# Patient Record
Sex: FEMALE | Race: OTHER | URBAN - METROPOLITAN AREA
[De-identification: names, ages, dates, MRNs, and addresses within clinical notes are randomized per-mention and may not be internally consistent; named-entity substitution may affect disease eponyms.]

---

## 2019-02-26 ENCOUNTER — EMERGENCY (EMERGENCY)
Facility: HOSPITAL | Age: 24
LOS: 1 days | Discharge: HOME | End: 2019-02-26
Attending: EMERGENCY MEDICINE | Admitting: EMERGENCY MEDICINE
Payer: MEDICAID

## 2019-02-26 VITALS
SYSTOLIC BLOOD PRESSURE: 128 MMHG | TEMPERATURE: 98 F | RESPIRATION RATE: 16 BRPM | HEART RATE: 85 BPM | OXYGEN SATURATION: 100 % | DIASTOLIC BLOOD PRESSURE: 50 MMHG

## 2019-02-26 LAB
APPEARANCE UR: CLEAR — SIGNIFICANT CHANGE UP
BACTERIA # UR AUTO: NEGATIVE — SIGNIFICANT CHANGE UP
BILIRUB UR-MCNC: NEGATIVE — SIGNIFICANT CHANGE UP
BLOOD UR QL VISUAL: HIGH
COLOR SPEC: SIGNIFICANT CHANGE UP
GLUCOSE UR-MCNC: NEGATIVE — SIGNIFICANT CHANGE UP
HCG SERPL-ACNC: < 5 MIU/ML — SIGNIFICANT CHANGE UP
HYALINE CASTS # UR AUTO: NEGATIVE — SIGNIFICANT CHANGE UP
KETONES UR-MCNC: NEGATIVE — SIGNIFICANT CHANGE UP
LEUKOCYTE ESTERASE UR-ACNC: NEGATIVE — SIGNIFICANT CHANGE UP
NITRITE UR-MCNC: NEGATIVE — SIGNIFICANT CHANGE UP
PH UR: 6 — SIGNIFICANT CHANGE UP (ref 5–8)
PROT UR-MCNC: 20 — SIGNIFICANT CHANGE UP
RBC CASTS # UR COMP ASSIST: SIGNIFICANT CHANGE UP (ref 0–?)
SP GR SPEC: 1.03 — SIGNIFICANT CHANGE UP (ref 1–1.04)
SQUAMOUS # UR AUTO: SIGNIFICANT CHANGE UP
UROBILINOGEN FLD QL: NORMAL — SIGNIFICANT CHANGE UP
WBC UR QL: SIGNIFICANT CHANGE UP (ref 0–?)

## 2019-02-26 PROCEDURE — 99283 EMERGENCY DEPT VISIT LOW MDM: CPT

## 2019-02-26 NOTE — ED PROVIDER NOTE - OBJECTIVE STATEMENT
23F h/o molar pregnancy and irregular menses presents with pelvic pain two days ago, and N/V two days ago.  Symptoms mostly improved, but started having heavy vaginal bleeding today and passed a large clot.  Concerned about pregnancy/miscarriage, as her menses is 10 days late.  Also endorses frequent urination.  Denies fever/chills, cp, sob, diarrhea, any further N/V.

## 2019-02-26 NOTE — ED PROVIDER NOTE - NSFOLLOWUPINSTRUCTIONS_ED_ALL_ED_FT
Your urine and blood result was negative in the emergency department.      Recommend follow up with gynecology.    Return to the ER for any worsening symptoms.

## 2019-02-26 NOTE — ED PROVIDER NOTE - CLINICAL SUMMARY MEDICAL DECISION MAKING FREE TEXT BOX
- pelvic pain (self resolved) and vaginal bleeding, eval pregnancy, eval UTI  - hcg, ua, urine culture

## 2019-02-26 NOTE — ED ADULT TRIAGE NOTE - CHIEF COMPLAINT QUOTE
Pt c/o lower abdominal/pelvic pain, nausea, w/ intermittent vaginal bleeding (none currently), states 10 days late for LMP.  States went to [facility], had negative UCG, and advised to come to ED for Serum HCG, TVUS and r/o ectopic. Denies urinary symptoms. Pt appearing in no acute distress, vitally stable.  PMHx molar pregnancy 2016.

## 2019-02-28 LAB
BACTERIA UR CULT: SIGNIFICANT CHANGE UP
SPECIMEN SOURCE: SIGNIFICANT CHANGE UP

## 2020-02-06 ENCOUNTER — EMERGENCY (EMERGENCY)
Facility: HOSPITAL | Age: 25
LOS: 1 days | Discharge: HOME | End: 2020-02-06
Attending: EMERGENCY MEDICINE
Payer: SELF-PAY

## 2020-02-06 VITALS
DIASTOLIC BLOOD PRESSURE: 70 MMHG | HEART RATE: 76 BPM | SYSTOLIC BLOOD PRESSURE: 105 MMHG | RESPIRATION RATE: 18 BRPM | WEIGHT: 123.9 LBS | TEMPERATURE: 97 F | HEIGHT: 63 IN | OXYGEN SATURATION: 98 %

## 2020-02-06 LAB
ALBUMIN SERPL ELPH-MCNC: 3.8 G/DL — SIGNIFICANT CHANGE UP (ref 3.5–5)
ALP SERPL-CCNC: 46 U/L — SIGNIFICANT CHANGE UP (ref 40–120)
ALT FLD-CCNC: 30 U/L DA — SIGNIFICANT CHANGE UP (ref 10–60)
ANION GAP SERPL CALC-SCNC: 6 MMOL/L — SIGNIFICANT CHANGE UP (ref 5–17)
AST SERPL-CCNC: 25 U/L — SIGNIFICANT CHANGE UP (ref 10–40)
BASOPHILS # BLD AUTO: 0.02 K/UL — SIGNIFICANT CHANGE UP (ref 0–0.2)
BASOPHILS NFR BLD AUTO: 0.3 % — SIGNIFICANT CHANGE UP (ref 0–2)
BILIRUB SERPL-MCNC: 0.8 MG/DL — SIGNIFICANT CHANGE UP (ref 0.2–1.2)
BUN SERPL-MCNC: 15 MG/DL — SIGNIFICANT CHANGE UP (ref 7–18)
CALCIUM SERPL-MCNC: 9.1 MG/DL — SIGNIFICANT CHANGE UP (ref 8.4–10.5)
CHLORIDE SERPL-SCNC: 108 MMOL/L — SIGNIFICANT CHANGE UP (ref 96–108)
CO2 SERPL-SCNC: 27 MMOL/L — SIGNIFICANT CHANGE UP (ref 22–31)
CREAT SERPL-MCNC: 0.78 MG/DL — SIGNIFICANT CHANGE UP (ref 0.5–1.3)
EOSINOPHIL # BLD AUTO: 0.2 K/UL — SIGNIFICANT CHANGE UP (ref 0–0.5)
EOSINOPHIL NFR BLD AUTO: 3.4 % — SIGNIFICANT CHANGE UP (ref 0–6)
GLUCOSE SERPL-MCNC: 81 MG/DL — SIGNIFICANT CHANGE UP (ref 70–99)
HCG UR QL: NEGATIVE — SIGNIFICANT CHANGE UP
HCT VFR BLD CALC: 37.8 % — SIGNIFICANT CHANGE UP (ref 34.5–45)
HGB BLD-MCNC: 12.8 G/DL — SIGNIFICANT CHANGE UP (ref 11.5–15.5)
IMM GRANULOCYTES NFR BLD AUTO: 0.3 % — SIGNIFICANT CHANGE UP (ref 0–1.5)
LYMPHOCYTES # BLD AUTO: 1.89 K/UL — SIGNIFICANT CHANGE UP (ref 1–3.3)
LYMPHOCYTES # BLD AUTO: 31.7 % — SIGNIFICANT CHANGE UP (ref 13–44)
MCHC RBC-ENTMCNC: 27 PG — SIGNIFICANT CHANGE UP (ref 27–34)
MCHC RBC-ENTMCNC: 33.9 GM/DL — SIGNIFICANT CHANGE UP (ref 32–36)
MCV RBC AUTO: 79.7 FL — LOW (ref 80–100)
MONOCYTES # BLD AUTO: 0.6 K/UL — SIGNIFICANT CHANGE UP (ref 0–0.9)
MONOCYTES NFR BLD AUTO: 10.1 % — SIGNIFICANT CHANGE UP (ref 2–14)
NEUTROPHILS # BLD AUTO: 3.23 K/UL — SIGNIFICANT CHANGE UP (ref 1.8–7.4)
NEUTROPHILS NFR BLD AUTO: 54.2 % — SIGNIFICANT CHANGE UP (ref 43–77)
NRBC # BLD: 0 /100 WBCS — SIGNIFICANT CHANGE UP (ref 0–0)
PLATELET # BLD AUTO: 212 K/UL — SIGNIFICANT CHANGE UP (ref 150–400)
POTASSIUM SERPL-MCNC: 4 MMOL/L — SIGNIFICANT CHANGE UP (ref 3.5–5.3)
POTASSIUM SERPL-SCNC: 4 MMOL/L — SIGNIFICANT CHANGE UP (ref 3.5–5.3)
PROT SERPL-MCNC: 7.9 G/DL — SIGNIFICANT CHANGE UP (ref 6–8.3)
RBC # BLD: 4.74 M/UL — SIGNIFICANT CHANGE UP (ref 3.8–5.2)
RBC # FLD: 12.7 % — SIGNIFICANT CHANGE UP (ref 10.3–14.5)
SODIUM SERPL-SCNC: 141 MMOL/L — SIGNIFICANT CHANGE UP (ref 135–145)
TSH SERPL-MCNC: 0.68 UU/ML — SIGNIFICANT CHANGE UP (ref 0.34–4.82)
WBC # BLD: 5.96 K/UL — SIGNIFICANT CHANGE UP (ref 3.8–10.5)
WBC # FLD AUTO: 5.96 K/UL — SIGNIFICANT CHANGE UP (ref 3.8–10.5)

## 2020-02-06 PROCEDURE — 70450 CT HEAD/BRAIN W/O DYE: CPT

## 2020-02-06 PROCEDURE — 84443 ASSAY THYROID STIM HORMONE: CPT

## 2020-02-06 PROCEDURE — 36415 COLL VENOUS BLD VENIPUNCTURE: CPT

## 2020-02-06 PROCEDURE — 85027 COMPLETE CBC AUTOMATED: CPT

## 2020-02-06 PROCEDURE — 99284 EMERGENCY DEPT VISIT MOD MDM: CPT

## 2020-02-06 PROCEDURE — 80053 COMPREHEN METABOLIC PANEL: CPT

## 2020-02-06 PROCEDURE — 70450 CT HEAD/BRAIN W/O DYE: CPT | Mod: 26

## 2020-02-06 PROCEDURE — 99284 EMERGENCY DEPT VISIT MOD MDM: CPT | Mod: 25

## 2020-02-06 PROCEDURE — 81025 URINE PREGNANCY TEST: CPT

## 2020-02-06 NOTE — ED ADULT NURSE REASSESSMENT NOTE - NS ED NURSE REASSESS COMMENT FT1
FEELS BETTER UPON DISCHARGE .DECLINED REPEAT VS. HEPLOCK REMOVED ,NO REDNESS , SWELLING NOTED HEPLOCK SITE .

## 2020-02-06 NOTE — ED PROVIDER NOTE - PATIENT PORTAL LINK FT
You can access the FollowMyHealth Patient Portal offered by [facility] by registering at the following website: [email or web address]. By joining [employer]’s FollowMyHealth portal, you will also be able to view your health information using other applications (apps) compatible with our system.

## 2020-02-06 NOTE — ED PROVIDER NOTE - CLINICAL SUMMARY MEDICAL DECISION MAKING FREE TEXT BOX
Patient with headache. Obtain labs Patient with headache and x1 episode of diarrhea. Likely early migraine vs viral syndrome. Obtain labs, CT scan. Have patient follow up with opthalmology/ neurology.

## 2020-02-06 NOTE — ED ADULT NURSE NOTE - NSFALLRSKUNASSIST_ED_ALL_ED
Pt is requesting a 90-day rx refill of metoprolol succinate XL 25mg.    [facility] #[phone].   no

## 2020-02-06 NOTE — ED ADULT NURSE NOTE - OBJECTIVE STATEMENT
States she has on and off  headache with changes in vision, forgetful, loss of balance for 6 months worse the past 2 weeks .Had diarrhea yesterday.

## 2020-02-06 NOTE — ED PROVIDER NOTE - OBJECTIVE STATEMENT
25 y/o F patient w/ no significant PMHx and no significant PSHx presents to the ED with frontal headache for the past x2 weeks. Patient says she has been having intermittent headaches for the past x6 months. Patient says she is usually at work during the onset of her headaches and describes the pain as a 5/10. Patient also says she often has pain at the back of her eyes and pain when looking at lights. Patient says she uses eye glasses and contacts. Patient denies nausea, vomiting, and any other complaints .LMP 12/24/2019. NKDA.

## 2021-06-16 ENCOUNTER — EMERGENCY (EMERGENCY)
Facility: HOSPITAL | Age: 26
LOS: 1 days | Discharge: HOME | End: 2021-06-16
Admitting: EMERGENCY MEDICINE
Payer: COMMERCIAL

## 2021-06-16 VITALS
HEIGHT: 63 IN | HEART RATE: 70 BPM | SYSTOLIC BLOOD PRESSURE: 97 MMHG | TEMPERATURE: 98 F | OXYGEN SATURATION: 100 % | RESPIRATION RATE: 17 BRPM | DIASTOLIC BLOOD PRESSURE: 63 MMHG

## 2021-06-16 PROBLEM — Z78.9 OTHER SPECIFIED HEALTH STATUS: Chronic | Status: ACTIVE | Noted: 2020-02-06

## 2021-06-16 LAB
APPEARANCE UR: CLEAR — SIGNIFICANT CHANGE UP
BILIRUB UR-MCNC: NEGATIVE — SIGNIFICANT CHANGE UP
COLOR SPEC: YELLOW — SIGNIFICANT CHANGE UP
DIFF PNL FLD: NEGATIVE — SIGNIFICANT CHANGE UP
GLUCOSE UR QL: NEGATIVE — SIGNIFICANT CHANGE UP
KETONES UR-MCNC: ABNORMAL
LEUKOCYTE ESTERASE UR-ACNC: NEGATIVE — SIGNIFICANT CHANGE UP
NITRITE UR-MCNC: NEGATIVE — SIGNIFICANT CHANGE UP
PH UR: 7 — SIGNIFICANT CHANGE UP (ref 5–8)
PROT UR-MCNC: ABNORMAL
SP GR SPEC: 1.03 — HIGH (ref 1.01–1.02)
UROBILINOGEN FLD QL: SIGNIFICANT CHANGE UP

## 2021-06-16 PROCEDURE — 76830 TRANSVAGINAL US NON-OB: CPT | Mod: 26

## 2021-06-16 PROCEDURE — 99284 EMERGENCY DEPT VISIT MOD MDM: CPT

## 2021-06-16 PROCEDURE — 73110 X-RAY EXAM OF WRIST: CPT | Mod: 26,RT

## 2021-06-16 NOTE — ED PROVIDER NOTE - CHPI ED SYMPTOMS NEG
no vision changes, no nausea, no vomiting, no abdominal pain, no chest pain, no shortness of breath/no headache
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
## 2021-06-16 NOTE — ED PROVIDER NOTE - PATIENT PORTAL LINK FT
You can access the FollowMyHealth Patient Portal offered by [facility] by registering at the following website: [email or web address]. By joining [employer]’s FollowMyHealth portal, you will also be able to view your health information using other applications (apps) compatible with our system.

## 2021-06-16 NOTE — ED PROVIDER NOTE - NSFOLLOWUPINSTRUCTIONS_ED_ALL_ED_FT
Advance activity as tolerated.  Continue all previously prescribed medications as directed unless otherwise instructed.  Follow up with your primary care physician in 48-72 hours- bring copies of your results.  Return to the ER for worsening or persistent symptoms, and/or ANY NEW OR CONCERNING SYMPTOMS. If you have issues obtaining follow up, please call: [phone] to obtain a doctor or specialist who takes your insurance in your area.  You may call [phone] to make an appointment with the internal medicine clinic.

## 2021-06-16 NOTE — ED PROVIDER NOTE - EXTREMITY EXAM
+tenderness to radial aspect of R wrist w/ palpation. No snuff box tenderness. Full active ROM of L wrist. No snuff box tenderness there.
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
## 2021-06-16 NOTE — ED PROVIDER NOTE - CARE PLAN
Principal Discharge DX:	Wrist sprain  Secondary Diagnosis:	Musculoskeletal pain  Secondary Diagnosis:	Pregnancy

## 2021-06-16 NOTE — ED PROVIDER NOTE - NSFOLLOWUPCLINICSTOKEN_GEN_ALL_ED_FT
[id number]: || ||00\01||False;[id number]: || ||00\01||False;[id number]: || ||00\01||False;[id number]: || ||00\01||False;[id number]: || ||00\01||False;[id number]: || ||00\01||False;
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
## 2021-08-09 ENCOUNTER — EMERGENCY (EMERGENCY)
Facility: HOSPITAL | Age: 26
LOS: 0 days | Discharge: HOME | End: 2021-08-09
Payer: COMMERCIAL

## 2021-08-09 VITALS
DIASTOLIC BLOOD PRESSURE: 73 MMHG | WEIGHT: 126.99 LBS | SYSTOLIC BLOOD PRESSURE: 104 MMHG | HEIGHT: 63 IN | TEMPERATURE: 98 F | HEART RATE: 78 BPM | RESPIRATION RATE: 18 BRPM | OXYGEN SATURATION: 99 %

## 2021-08-09 DIAGNOSIS — S61.011A LACERATION WITHOUT FOREIGN BODY OF RIGHT THUMB WITHOUT DAMAGE TO NAIL, INITIAL ENCOUNTER: ICD-10-CM
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
## 2021-08-09 DIAGNOSIS — M79.644 PAIN IN RIGHT FINGER(S): ICD-10-CM

## 2021-08-09 PROBLEM — D35.2 BENIGN NEOPLASM OF PITUITARY GLAND: Chronic | Status: ACTIVE | Noted: 2021-06-16

## 2021-08-09 PROCEDURE — 99283 EMERGENCY DEPT VISIT LOW MDM: CPT

## 2021-08-09 RX ADMIN — Medication 1 TABLET(S): at 13:10

## 2021-08-09 NOTE — ED PROVIDER NOTE - SKIN WOUND DESCRIPTION
Partially healed flap-like laceration with gauze adhered to scab on lateral edge of wound.  Diffuse swelling to the area and tenderness to palpation/with manipulation. No purulent drainage appreciated./FLAP LIKE/SWOLLEN

## 2021-08-09 NOTE — ED PROVIDER NOTE - PATIENT PORTAL LINK FT
You can access the FollowMyHealth Patient Portal offered by [facility] by registering at the following website: [email or web address]. By joining [employer]’s FollowMyHealth portal, you will also be able to view your health information using other applications (apps) compatible with our system.

## 2021-08-09 NOTE — ED ADULT TRIAGE NOTE - TEMPERATURE IN FAHRENHEIT (DEGREES F)
[place] [address]    [place] [address]    Phone:  [phone]                                       After Visit Summary   11/8/2018    Markel Baron    MRN: [id number]           After Visit Summary Signature Page     I have received my discharge instructions, and my questions have been answered. I have discussed any challenges I see with this plan with the nurse or doctor.    ..........................................................................................................................................  Patient/Patient Representative Signature      ..........................................................................................................................................  Patient Representative Print Name and Relationship to Patient    ..................................................               ................................................  Date                                   Time    ..........................................................................................................................................  Reviewed by Signature/Title    ...................................................              ..............................................  Date                                               Time          22EPIC Rev 08/18         97.8

## 2021-08-09 NOTE — ED PROVIDER NOTE - CLINICAL SUMMARY MEDICAL DECISION MAKING FREE TEXT BOX
laceration of the finger, no bony tenderness, injury sustained 2 days ago so will not close wound, plan for irrigation, antibiotics, wound care

## 2021-08-09 NOTE — ED PROVIDER NOTE - OBJECTIVE STATEMENT
Patient is a 25F with PMHx of a pituitary adenoma presenting to the [facility] ED with a right thumb laceration. Patient states that she cut the pad of her thumb with a knife 2 days ago. She went to a [facility] MD where the wound was cleaned and wrapped in a gauze dressing. Patient was encouraged to come to the ED at the time, but decided against it. She presents now with increased pain and swelling of the digit. Patient states that there has been serosanguinous drainage from the wound, denies pus. Patient states that she has kept the wound covered with gauze dressings over the past 2 days, but has not used any antimicrobial ointment at the site. Patient states that the pain is 6/10 with manipulation and admits to numbness and tingling in the thumb and occasionally in the second digit.  Patient denies fever, chills, SOB, difficulty breathing, cough, or decreased sensation in the hand/digits. Patient is a 25F with PMHx of a pituitary adenoma presenting to the [facility] ED with a right thumb laceration. Patient states that she cut the pad of her thumb with a knife 2 days ago. She went to a [facility] MD where the wound was cleaned and wrapped in a gauze dressing. Patient was encouraged to come to the ED at the time, but decided against it. She presents now with increased pain and swelling of the digit. Patient states that there has been serosanguinous drainage from the wound, denies pus. Patient states that she has kept the wound covered with gauze dressings over the past 2 days, but has not used any antimicrobial ointment at the site. Patient states that the pain is 6/10 with manipulation and admits to numbness and tingling in the thumb and occasionally in the second digit.  Patient denies fever, chills, SOB, difficulty breathing, cough, or decreased sensation in the hand/digits. Tetanus is up to date.
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
## 2022-03-29 NOTE — ED PROVIDER NOTE - NS_ATTENDINGSCRIBE_ED_ALL_ED
Labor
I personally performed the service described in the documentation recorded by the scribe in my presence, and it accurately and completely records my words and actions.

## 2023-11-30 NOTE — ED ADULT NURSE NOTE - EENT WDL
Please fax lab order for BMP and BNP to [facility] nurse   Eyes with no visual disturbances.  Ears clean and dry and no hearing difficulties. Nose with pink mucosa and no drainage.  Mouth mucous membranes moist and pink.  No tenderness or swelling to throat or neck.

## 2023-12-06 ENCOUNTER — EMERGENCY (EMERGENCY)
Facility: HOSPITAL | Age: 28
LOS: 0 days | Discharge: HOME | End: 2023-12-06
Attending: STUDENT IN AN ORGANIZED HEALTH CARE EDUCATION/TRAINING PROGRAM
Payer: COMMERCIAL

## 2023-12-06 VITALS
SYSTOLIC BLOOD PRESSURE: 110 MMHG | DIASTOLIC BLOOD PRESSURE: 72 MMHG | OXYGEN SATURATION: 100 % | TEMPERATURE: 97 F | HEART RATE: 72 BPM | RESPIRATION RATE: 18 BRPM

## 2023-12-06 VITALS
SYSTOLIC BLOOD PRESSURE: 101 MMHG | HEART RATE: 80 BPM | DIASTOLIC BLOOD PRESSURE: 69 MMHG | TEMPERATURE: 98 F | HEIGHT: 63 IN | OXYGEN SATURATION: 100 % | WEIGHT: 139.99 LBS | RESPIRATION RATE: 16 BRPM

## 2023-12-06 DIAGNOSIS — R10.13 EPIGASTRIC PAIN: ICD-10-CM

## 2023-12-06 DIAGNOSIS — R11.0 NAUSEA: ICD-10-CM

## 2023-12-06 DIAGNOSIS — K59.00 CONSTIPATION, UNSPECIFIED: ICD-10-CM

## 2023-12-06 LAB
ALBUMIN SERPL ELPH-MCNC: 3.5 G/DL — SIGNIFICANT CHANGE UP (ref 3.3–5)
ALBUMIN SERPL ELPH-MCNC: 3.5 G/DL — SIGNIFICANT CHANGE UP (ref 3.3–5)
ALP SERPL-CCNC: 62 U/L — SIGNIFICANT CHANGE UP (ref 40–120)
ALP SERPL-CCNC: 62 U/L — SIGNIFICANT CHANGE UP (ref 40–120)
ALT FLD-CCNC: 22 U/L — SIGNIFICANT CHANGE UP (ref 12–78)
ALT FLD-CCNC: 22 U/L — SIGNIFICANT CHANGE UP (ref 12–78)
ANION GAP SERPL CALC-SCNC: 8 MMOL/L — SIGNIFICANT CHANGE UP (ref 5–17)
ANION GAP SERPL CALC-SCNC: 8 MMOL/L — SIGNIFICANT CHANGE UP (ref 5–17)
AST SERPL-CCNC: 19 U/L — SIGNIFICANT CHANGE UP (ref 15–37)
AST SERPL-CCNC: 19 U/L — SIGNIFICANT CHANGE UP (ref 15–37)
BASOPHILS # BLD AUTO: 0.03 K/UL — SIGNIFICANT CHANGE UP (ref 0–0.2)
BASOPHILS # BLD AUTO: 0.03 K/UL — SIGNIFICANT CHANGE UP (ref 0–0.2)
BASOPHILS NFR BLD AUTO: 0.4 % — SIGNIFICANT CHANGE UP (ref 0–2)
BASOPHILS NFR BLD AUTO: 0.4 % — SIGNIFICANT CHANGE UP (ref 0–2)
BILIRUB SERPL-MCNC: 0.4 MG/DL — SIGNIFICANT CHANGE UP (ref 0.2–1.2)
BILIRUB SERPL-MCNC: 0.4 MG/DL — SIGNIFICANT CHANGE UP (ref 0.2–1.2)
BUN SERPL-MCNC: 15 MG/DL — SIGNIFICANT CHANGE UP (ref 7–23)
BUN SERPL-MCNC: 15 MG/DL — SIGNIFICANT CHANGE UP (ref 7–23)
CALCIUM SERPL-MCNC: 8.9 MG/DL — SIGNIFICANT CHANGE UP (ref 8.5–10.1)
CALCIUM SERPL-MCNC: 8.9 MG/DL — SIGNIFICANT CHANGE UP (ref 8.5–10.1)
CHLORIDE SERPL-SCNC: 105 MMOL/L — SIGNIFICANT CHANGE UP (ref 96–108)
CHLORIDE SERPL-SCNC: 105 MMOL/L — SIGNIFICANT CHANGE UP (ref 96–108)
CO2 SERPL-SCNC: 26 MMOL/L — SIGNIFICANT CHANGE UP (ref 22–31)
CO2 SERPL-SCNC: 26 MMOL/L — SIGNIFICANT CHANGE UP (ref 22–31)
CREAT SERPL-MCNC: 0.64 MG/DL — SIGNIFICANT CHANGE UP (ref 0.5–1.3)
CREAT SERPL-MCNC: 0.64 MG/DL — SIGNIFICANT CHANGE UP (ref 0.5–1.3)
EGFR: 124 ML/MIN/1.73M2 — SIGNIFICANT CHANGE UP
EGFR: 124 ML/MIN/1.73M2 — SIGNIFICANT CHANGE UP
EOSINOPHIL # BLD AUTO: 0.25 K/UL — SIGNIFICANT CHANGE UP (ref 0–0.5)
EOSINOPHIL # BLD AUTO: 0.25 K/UL — SIGNIFICANT CHANGE UP (ref 0–0.5)
EOSINOPHIL NFR BLD AUTO: 3.3 % — SIGNIFICANT CHANGE UP (ref 0–6)
EOSINOPHIL NFR BLD AUTO: 3.3 % — SIGNIFICANT CHANGE UP (ref 0–6)
GLUCOSE SERPL-MCNC: 126 MG/DL — HIGH (ref 70–99)
GLUCOSE SERPL-MCNC: 126 MG/DL — HIGH (ref 70–99)
HCG SERPL-ACNC: <1 MIU/ML — SIGNIFICANT CHANGE UP
HCG SERPL-ACNC: <1 MIU/ML — SIGNIFICANT CHANGE UP
HCT VFR BLD CALC: 37.3 % — SIGNIFICANT CHANGE UP (ref 34.5–45)
HCT VFR BLD CALC: 37.3 % — SIGNIFICANT CHANGE UP (ref 34.5–45)
HGB BLD-MCNC: 12.5 G/DL — SIGNIFICANT CHANGE UP (ref 11.5–15.5)
HGB BLD-MCNC: 12.5 G/DL — SIGNIFICANT CHANGE UP (ref 11.5–15.5)
IMM GRANULOCYTES NFR BLD AUTO: 0.3 % — SIGNIFICANT CHANGE UP (ref 0–0.9)
IMM GRANULOCYTES NFR BLD AUTO: 0.3 % — SIGNIFICANT CHANGE UP (ref 0–0.9)
LIDOCAIN IGE QN: 46 U/L — SIGNIFICANT CHANGE UP (ref 13–75)
LIDOCAIN IGE QN: 46 U/L — SIGNIFICANT CHANGE UP (ref 13–75)
LYMPHOCYTES # BLD AUTO: 2.3 K/UL — SIGNIFICANT CHANGE UP (ref 1–3.3)
LYMPHOCYTES # BLD AUTO: 2.3 K/UL — SIGNIFICANT CHANGE UP (ref 1–3.3)
LYMPHOCYTES # BLD AUTO: 30.2 % — SIGNIFICANT CHANGE UP (ref 13–44)
LYMPHOCYTES # BLD AUTO: 30.2 % — SIGNIFICANT CHANGE UP (ref 13–44)
MCHC RBC-ENTMCNC: 26.5 PG — LOW (ref 27–34)
MCHC RBC-ENTMCNC: 26.5 PG — LOW (ref 27–34)
MCHC RBC-ENTMCNC: 33.5 G/DL — SIGNIFICANT CHANGE UP (ref 32–36)
MCHC RBC-ENTMCNC: 33.5 G/DL — SIGNIFICANT CHANGE UP (ref 32–36)
MCV RBC AUTO: 79 FL — LOW (ref 80–100)
MCV RBC AUTO: 79 FL — LOW (ref 80–100)
MONOCYTES # BLD AUTO: 0.69 K/UL — SIGNIFICANT CHANGE UP (ref 0–0.9)
MONOCYTES # BLD AUTO: 0.69 K/UL — SIGNIFICANT CHANGE UP (ref 0–0.9)
MONOCYTES NFR BLD AUTO: 9.1 % — SIGNIFICANT CHANGE UP (ref 2–14)
MONOCYTES NFR BLD AUTO: 9.1 % — SIGNIFICANT CHANGE UP (ref 2–14)
NEUTROPHILS # BLD AUTO: 4.32 K/UL — SIGNIFICANT CHANGE UP (ref 1.8–7.4)
NEUTROPHILS # BLD AUTO: 4.32 K/UL — SIGNIFICANT CHANGE UP (ref 1.8–7.4)
NEUTROPHILS NFR BLD AUTO: 56.7 % — SIGNIFICANT CHANGE UP (ref 43–77)
NEUTROPHILS NFR BLD AUTO: 56.7 % — SIGNIFICANT CHANGE UP (ref 43–77)
NRBC # BLD: 0 /100 WBCS — SIGNIFICANT CHANGE UP (ref 0–0)
NRBC # BLD: 0 /100 WBCS — SIGNIFICANT CHANGE UP (ref 0–0)
PLATELET # BLD AUTO: 269 K/UL — SIGNIFICANT CHANGE UP (ref 150–400)
PLATELET # BLD AUTO: 269 K/UL — SIGNIFICANT CHANGE UP (ref 150–400)
POTASSIUM SERPL-MCNC: 3.4 MMOL/L — LOW (ref 3.5–5.3)
POTASSIUM SERPL-MCNC: 3.4 MMOL/L — LOW (ref 3.5–5.3)
POTASSIUM SERPL-SCNC: 3.4 MMOL/L — LOW (ref 3.5–5.3)
POTASSIUM SERPL-SCNC: 3.4 MMOL/L — LOW (ref 3.5–5.3)
PROT SERPL-MCNC: 7.8 GM/DL — SIGNIFICANT CHANGE UP (ref 6–8.3)
PROT SERPL-MCNC: 7.8 GM/DL — SIGNIFICANT CHANGE UP (ref 6–8.3)
RBC # BLD: 4.72 M/UL — SIGNIFICANT CHANGE UP (ref 3.8–5.2)
RBC # BLD: 4.72 M/UL — SIGNIFICANT CHANGE UP (ref 3.8–5.2)
RBC # FLD: 12.1 % — SIGNIFICANT CHANGE UP (ref 10.3–14.5)
RBC # FLD: 12.1 % — SIGNIFICANT CHANGE UP (ref 10.3–14.5)
SODIUM SERPL-SCNC: 139 MMOL/L — SIGNIFICANT CHANGE UP (ref 135–145)
SODIUM SERPL-SCNC: 139 MMOL/L — SIGNIFICANT CHANGE UP (ref 135–145)
WBC # BLD: 7.61 K/UL — SIGNIFICANT CHANGE UP (ref 3.8–10.5)
WBC # BLD: 7.61 K/UL — SIGNIFICANT CHANGE UP (ref 3.8–10.5)
WBC # FLD AUTO: 7.61 K/UL — SIGNIFICANT CHANGE UP (ref 3.8–10.5)
WBC # FLD AUTO: 7.61 K/UL — SIGNIFICANT CHANGE UP (ref 3.8–10.5)

## 2023-12-06 PROCEDURE — 99284 EMERGENCY DEPT VISIT MOD MDM: CPT

## 2023-12-06 RX ORDER — FAMOTIDINE 10 MG/ML
20 INJECTION INTRAVENOUS ONCE
Refills: 0 | Status: COMPLETED | OUTPATIENT
Start: 2023-12-06 | End: 2023-12-06

## 2023-12-06 RX ORDER — SUCRALFATE 1 G
1 TABLET ORAL ONCE
Refills: 0 | Status: COMPLETED | OUTPATIENT
Start: 2023-12-06 | End: 2023-12-06

## 2023-12-06 RX ORDER — MORPHINE SULFATE 50 MG/1
2 CAPSULE, EXTENDED RELEASE ORAL ONCE
Refills: 0 | Status: DISCONTINUED | OUTPATIENT
Start: 2023-12-06 | End: 2023-12-06

## 2023-12-06 RX ORDER — IPRATROPIUM/ALBUTEROL SULFATE 18-103MCG
3 AEROSOL WITH ADAPTER (GRAM) INHALATION ONCE
Refills: 0 | Status: DISCONTINUED | OUTPATIENT
Start: 2023-12-06 | End: 2023-12-06

## 2023-12-06 RX ORDER — FAMOTIDINE 10 MG/ML
1 INJECTION INTRAVENOUS
Qty: 14 | Refills: 0
Start: 2023-12-06 | End: 2023-12-19

## 2023-12-06 RX ADMIN — FAMOTIDINE 20 MILLIGRAM(S): 10 INJECTION INTRAVENOUS at 03:02

## 2023-12-06 RX ADMIN — Medication 30 MILLILITER(S): at 03:02

## 2023-12-06 RX ADMIN — MORPHINE SULFATE 2 MILLIGRAM(S): 50 CAPSULE, EXTENDED RELEASE ORAL at 03:02

## 2023-12-06 RX ADMIN — Medication 1 GRAM(S): at 04:19

## 2023-12-06 RX ADMIN — MORPHINE SULFATE 2 MILLIGRAM(S): 50 CAPSULE, EXTENDED RELEASE ORAL at 04:19

## 2023-12-06 NOTE — ED PROVIDER NOTE - CLINICAL SUMMARY MEDICAL DECISION MAKING FREE TEXT BOX
Otherwise healthy 28F pw constant sharp / burning / stabbing epigastric pain onset 2hrs PTA. Afebrile, VSS. Pt appears uncomfortable / in pain, + TTP epigastrium / RUQ. Plan for pain control, CBC, CMP, HCG, lipase +/- CT AP, RUQ US. Re-eval. Otherwise healthy 28F pw constant sharp / burning / stabbing epigastric pain onset 2hrs PTA in setting of recent heavy NSAID use. Afebrile, VSS. Pt appears uncomfortable / in pain, + TTP epigastrium / RUQ. Plan for pain control, CBC, CMP, HCG, lipase +/- CT AP, RUQ US. Re-eval.  W/u w/o significant abnormalities. Clinical presentation most c/w gastritis / esophagitis / peptic ulcer. On re-eval, pt reports improvement in symptoms w/ ED meds. Stable for d/c home. Given script for Pepcid, recommend OTC antiacids PRN symptom relief. Given / instructed for close outpatient GI f/u. Return signs / symptoms d/w pt, cousin. They understand / agree w/ this plan.

## 2023-12-06 NOTE — ED ADULT NURSE NOTE - NSSEPSISSUSPECTED_ED_A_ED
Physical Therapy Initial Evaluation  Subjective:  The history is provided by the patient. No  was used.   Patient Health History  Karthik Lopez being seen for L shoulder.     Date of Onset: MD order 7/26/2021.   Problem occurred: unknown   Pain is reported as 4/10 on pain scale.    Pertinent medical history includes: diabetes, overweight, sleep disorder/apnea and thyroid problems.   Red flags:  Pain at rest/night.  Medical allergies: none.   Surgeries include:  None.    Current medications:  Anti-depressants and thyroid medication.    Current occupation is .   Primary job tasks include:  Computer work and prolonged sitting.                  Therapist Generated HPI Evaluation  Problem details: Reports insidious onset L lateral shoulder pain that radiates to the upper arm. Pain increases with overhead and behind the back reaching, also at night, wakes him while sleeping. .         Type of problem:  Left shoulder.    This is a new condition.  Condition occurred with:  Unknown cause.  Where condition occurred: for unknown reasons.  Patient reports pain:  Lateral.  Pain is described as sharp and is intermittent.  Pain radiates to:  Upper arm. Pain is worse during the night.  Since onset symptoms are gradually worsening.  Associated symptoms:  Loss of motion/stiffness, loss of strength and other (grinding). Symptoms are exacerbated by using arm behind back, other and using arm overhead (laying down/sleeping, rolling)  and relieved by rest.      Restrictions due to condition include:  Working in normal job without restrictions.  Barriers include:  None as reported by patient.                        Objective:  Standing Alignment:    Cervical/Thoracic:  Forward head  Shoulder/UE:  Rounded shoulders                  Flexibility/Screens:     Upper Extremity:    Decreased left upper extremity flexibility at:  Pectoralis Major and Pectoralis Minor    Decreased right upper extremity flexibility  present at:  Pectoralis Major and Pectoralis Minor                           Shoulder Evaluation:  ROM:  AROM:    Flexion:  Left:  120    Right:  180  Extension: Left: 50Right: 80  Abduction:  Left: 110   Right:  180                Flexion/External Rotation:  Left:  C7    Right:  T3  Extension/Internal Rotation:  Left:  Lateral buttock    Right:  T10    PROM:    Flexion:  Left:  130          Abduction:  Left:  95        Internal Rotation:  Left:  25 @ 50* abd      External Rotation:  Left:  15* at 50% abd                        Strength:    Flexion: Left:5-/5    Pain: +    Right: 5/5     Pain:   Extension:  Left: 5-/5    Pain:    Right: 5/5    Pain:  Abduction:  Left: 4+/5   Pain:++    Right: 5/5     Pain:    Internal Rotation:  Left:5/5     Pain:    Right: 5/5     Pain:  External Rotation:   Left:5-/5     Pain:   Right:5/5     Pain:              Special Tests:      Left shoulder negative for the following special tests:  Rotator cuff tear    Palpation:    Left shoulder tenderness present at:  Bicipital Groove  Left shoulder tenderness not present at: Supraspinatus or Infraspinatus                                       General     ROS    Assessment/Plan:    Patient is a 45 year old male with left side shoulder complaints.    Patient has the following significant findings with corresponding treatment plan.                Diagnosis 1:  L shoulder pain (likely adhesive capsulitis) Pain -  hot/cold therapy, manual therapy, education, directional preference exercise and home program  Decreased ROM/flexibility - manual therapy and therapeutic exercise  Decreased strength - therapeutic exercise and therapeutic activities  Impaired muscle performance - neuro re-education  Decreased function - therapeutic activities  Impaired posture - neuro re-education    Therapy Evaluation Codes:   1) History comprised of:   Personal factors that impact the plan of care:      None.    Comorbidity factors that impact the plan of care are:       Diabetes, Overweight, Pain at night/rest, Sleep disorder/apnea and thyroid problems.     Medications impacting care: Anti-depressant and thyroid.  2) Examination of Body Systems comprised of:   Body structures and functions that impact the plan of care:      Shoulder.   Activity limitations that impact the plan of care are:      Bathing, Cooking, Dressing, Reading/Computer work, Sleeping and Laying down.  3) Clinical presentation characteristics are:   Stable/Uncomplicated.  4) Decision-Making    Low complexity using standardized patient assessment instrument and/or measureable assessment of functional outcome.  Cumulative Therapy Evaluation is: Low complexity.    Previous and current functional limitations:  (See Goal Flow Sheet for this information)    Short term and Long term goals: (See Goal Flow Sheet for this information)     Communication ability:  Patient appears to be able to clearly communicate and understand verbal and written communication and follow directions correctly.  Treatment Explanation - The following has been discussed with the patient:   RX ordered/plan of care  Anticipated outcomes  Possible risks and side effects  This patient would benefit from PT intervention to resume normal activities.   Rehab potential is good.    Frequency:  1 X week, once daily  Duration:  for 4 weeks tapering to 2 X a month over 8 weeks  Discharge Plan:  Achieve all LTG.  Independent in home treatment program.  Reach maximal therapeutic benefit.    Please refer to the daily flowsheet for treatment today, total treatment time and time spent performing 1:1 timed codes.        No

## 2023-12-06 NOTE — ED ADULT NURSE NOTE - CAS EDN DISCHARGE ASSESSMENT
Pt A&O x4 no acute distress noted.Pt ambulate with equal steady gait/Alert and oriented to person, place and time/Patient baseline mental status/Awake

## 2023-12-06 NOTE — ED PROVIDER NOTE - PATIENT PORTAL LINK FT
You can access the FollowMyHealth Patient Portal offered by [facility] by registering at the following website: [email or web address]. By joining [employer]’s FollowMyHealth portal, you will also be able to view your health information using other applications (apps) compatible with our system. You can access the FollowMyHealth Patient Portal offered by [facility] by registering at the following website: [email or web address]. By joining [employer]’s FollowMyHealth portal, you will also be able to view your health information using other applications (apps) compatible with our system.

## 2023-12-06 NOTE — ED PROVIDER NOTE - OBJECTIVE STATEMENT
28F w/o significant PMH pw 2hrs constant, sharp / stabbing / burning epigastric pain w/ radiation across upper abd. Pt reports onset while in shower s/p getting home from work, eating small snack. Pt reports symptoms started out of the blue. Denies previous similar pain. No medications taken PTA for symptom relief. + nausea, constipation. Denies F/C, h/a, dizziness, CP, SOB, cough, vomiting, diarrhea, dysuria, hematuria, LE pain / swelling, recent travel, sick contacts, changes in diet.     PMH none, PSH none, NKDA, no meds, LMP 11/28.

## 2023-12-06 NOTE — ED PROVIDER NOTE - PHYSICAL EXAMINATION
GEN: Awake, alert, interactive, NAD.  HEAD AND NECK: NC/AT. Airway patent. Neck supple.   EYES:  Clear b/l. EOMI. PERRL.   ENT: Moist mucus membranes.   CARDIAC: Regular rate, regular rhythm. No evident pedal edema.    RESP/CHEST: Normal respiratory effort with no use of accessory muscles or retractions. Clear throughout on auscultation.  ABD: Soft, non-distended, + TTP epigastrium / RUQ. No rebound, no guarding.   BACK: No midline spinal TTP. No CVAT.   EXTREMITIES: Moving all extremities with no apparent deformities.   SKIN: Warm, dry, intact normal color. No rash.   NEURO: AOx3, CN II-XII grossly intact, no focal deficits.   PSYCH: Appropriate mood and affect.

## 2023-12-06 NOTE — ED ADULT NURSE NOTE - NSFALLUNIVINTERV_ED_ALL_ED
Bed/Stretcher in lowest position, wheels locked, appropriate side rails in place/Call bell, personal items and telephone in reach/Instruct patient to call for assistance before getting out of bed/chair/stretcher/Non-slip footwear applied when patient is off stretcher/[place] to call system/Physically safe environment - no spills, clutter or unnecessary equipment/Purposeful proactive rounding/Room/bathroom lighting operational, light cord in reach Bed/Stretcher in lowest position, wheels locked, appropriate side rails in place/Call bell, personal items and telephone in reach/Instruct patient to call for assistance before getting out of bed/chair/stretcher/Non-slip footwear applied when patient is off stretcher/[place] to call system/Physically safe environment - no spills, clutter or unnecessary equipment/Purposeful proactive rounding/Room/bathroom lighting operational, light cord in reach

## 2023-12-06 NOTE — ED ADULT TRIAGE NOTE - ARRIVAL FROM
Home
PT is arrousable to voice, alert to self and place, pt requesting help with alcohol abuse, last drink yesterday, is having "withdrawls" pt also c/o a rash in his groin area, states it burns and is aggravated when walking. PT states he is currently homeless, he just came from [place] and has no where to stay.

## 2024-03-07 NOTE — ED PROVIDER NOTE - NS ED MD EM SELECTION
Quality 130: Documentation Of Current Medications In The Medical Record: Current Medications Documented
Detail Level: Detailed
Quality 431: Preventive Care And Screening: Unhealthy Alcohol Use - Screening: Patient not identified as an unhealthy alcohol user when screened for unhealthy alcohol use using a systematic screening method
Quality 226: Preventive Care And Screening: Tobacco Use: Screening And Cessation Intervention: Patient screened for tobacco use and is an ex/non-smoker
01042 Detailed

## 2024-04-05 NOTE — ED ADULT TRIAGE NOTE - TEMPERATURE IN CELSIUS (DEGREES C)
I called patient in regards to surgery with Dr. Ab Skinner . I was unable to reach patient, but a voicemail and a call back number were left on patients voicemail.        Bong Gray on 4/2/2024 at 2:51 PM    
I called patient in regards to surgery with Dr. Ab Skinner . I was unable to reach patient, but a voicemail and a call back number were left on patients voicemail.      Bong Gray on 3/21/2024 at 3:04 PM    
I called the patient in regards to scheduling surgery with Dr. Ab Skinner . Patient pre op has been taken care of by PAC via video. Patient is aware that the nursing team will be reaching out within the next few days. I did tell patient that a nurse will reach out within 2-3 days prior to surgery with arrival time and instructions.    Surgeon: Dr. Ab Skinner   Date of Surgery: 4/23/2024 and 4/29/2024  Location of surgery: [facility]  Pre-Op H&P: 4/9/2024  Post-Op Appt Date: 5/9/2024   Imaging needed:  Yes - MRI morning of surgery  Patient aware that pre-op RN will call 2-3 days prior to surgery with arrival time and instructions Yes    Notes:  - Patient rescheduled       Bong Gray on 4/5/2024 at 3:06 PM    
I called the patient in regards to scheduling surgery with Dr. Ab Skinner . Patient pre op has been taken care of by PAC via video. Patient is aware that the nursing team will be reaching out within the next few days. I did tell patient that a nurse will reach out within 2-3 days prior to surgery with arrival time and instructions.    Surgeon: Dr. Ab Skinner   Date of Surgery: 5/20/2024  Location of surgery: [facility]  Pre-Op H&P: 5/6/2024  Post-Op Appt Date: 6/6/2024   Imaging needed:  Yes  Pre-cert/Authorization completed:  Yes  Patient aware that pre-op RN will call 2-3 days prior to surgery with arrival time and instructions Yes        Bong Gray on 3/22/2024 at 3:33 PM      
36.3

## 2024-11-25 PROBLEM — Z00.00 ENCOUNTER FOR PREVENTIVE HEALTH EXAMINATION: Status: ACTIVE | Noted: 2024-11-25

## 2024-12-04 ENCOUNTER — ASOB RESULT (OUTPATIENT)
Age: 29
End: 2024-12-04

## 2024-12-04 ENCOUNTER — APPOINTMENT (OUTPATIENT)
Dept: ANTEPARTUM | Facility: CLINIC | Age: 29
End: 2024-12-04

## 2024-12-04 ENCOUNTER — APPOINTMENT (OUTPATIENT)
Dept: OBGYN | Facility: CLINIC | Age: 29
End: 2024-12-04
Payer: COMMERCIAL

## 2024-12-04 VITALS
DIASTOLIC BLOOD PRESSURE: 77 MMHG | WEIGHT: 145 LBS | HEIGHT: 64 IN | BODY MASS INDEX: 24.75 KG/M2 | SYSTOLIC BLOOD PRESSURE: 98 MMHG

## 2024-12-04 PROCEDURE — 99203 OFFICE O/P NEW LOW 30 MIN: CPT

## 2024-12-04 PROCEDURE — 36415 COLL VENOUS BLD VENIPUNCTURE: CPT

## 2024-12-04 PROCEDURE — 76813 OB US NUCHAL MEAS 1 GEST: CPT

## 2024-12-04 PROCEDURE — 76801 OB US < 14 WKS SINGLE FETUS: CPT | Mod: 59

## 2025-01-06 ENCOUNTER — APPOINTMENT (OUTPATIENT)
Dept: OBGYN | Facility: CLINIC | Age: 30
End: 2025-01-06
Payer: COMMERCIAL

## 2025-01-06 VITALS — BODY MASS INDEX: 25.4 KG/M2 | WEIGHT: 148 LBS | SYSTOLIC BLOOD PRESSURE: 97 MMHG | DIASTOLIC BLOOD PRESSURE: 68 MMHG

## 2025-01-06 DIAGNOSIS — Z3A.17 17 WEEKS GESTATION OF PREGNANCY: ICD-10-CM

## 2025-01-06 DIAGNOSIS — Z87.59 PERSONAL HISTORY OF OTHER COMPLICATIONS OF PREGNANCY, CHILDBIRTH AND THE PUERPERIUM: ICD-10-CM

## 2025-01-06 DIAGNOSIS — D35.2 BENIGN NEOPLASM OF PITUITARY GLAND: ICD-10-CM

## 2025-01-06 PROCEDURE — 99213 OFFICE O/P EST LOW 20 MIN: CPT

## 2025-01-06 PROCEDURE — 0502F SUBSEQUENT PRENATAL CARE: CPT

## 2025-01-06 PROCEDURE — 36415 COLL VENOUS BLD VENIPUNCTURE: CPT

## 2025-01-07 LAB
AFP INTERP SERPL-IMP: NORMAL
AFP INTERP SERPL-IMP: NORMAL
AFP MOM CUT-OFF: 2.5
AFP MOM SERPL: 1.04
AFP PERCENTILE: 54.8
AFP SERPL-ACNC: 41.68 NG/ML
CARBAMAZEPINE?: NO
CURRENT SMOKER: NO
DIABETES STATUS PATIENT: NO
GA: NORMAL
GESTATIONAL AGE METHOD: NORMAL
HX OF NTD NARR: NO
MULTIPLE PREGNANCY: NORMAL
NEURAL TUBE DEFECT RISK FETUS: NORMAL
NEURAL TUBE DEFECT RISK POP: NORMAL
RECOM F/U: NO
TEST PERFORMANCE INFO SPEC: NORMAL
VALPROIC ACID?: NO

## 2025-02-11 ENCOUNTER — APPOINTMENT (OUTPATIENT)
Dept: OBGYN | Facility: CLINIC | Age: 30
End: 2025-02-11
Payer: COMMERCIAL

## 2025-02-11 ENCOUNTER — APPOINTMENT (OUTPATIENT)
Dept: ANTEPARTUM | Facility: CLINIC | Age: 30
End: 2025-02-11
Payer: COMMERCIAL

## 2025-02-11 ENCOUNTER — ASOB RESULT (OUTPATIENT)
Age: 30
End: 2025-02-11

## 2025-02-11 VITALS
HEIGHT: 64 IN | WEIGHT: 154 LBS | DIASTOLIC BLOOD PRESSURE: 69 MMHG | BODY MASS INDEX: 26.29 KG/M2 | SYSTOLIC BLOOD PRESSURE: 102 MMHG

## 2025-02-11 DIAGNOSIS — Z87.59 PERSONAL HISTORY OF OTHER COMPLICATIONS OF PREGNANCY, CHILDBIRTH AND THE PUERPERIUM: ICD-10-CM

## 2025-02-11 DIAGNOSIS — D35.2 BENIGN NEOPLASM OF PITUITARY GLAND: ICD-10-CM

## 2025-02-11 PROCEDURE — 99213 OFFICE O/P EST LOW 20 MIN: CPT

## 2025-02-11 PROCEDURE — 76811 OB US DETAILED SNGL FETUS: CPT

## 2025-02-11 PROCEDURE — 0502F SUBSEQUENT PRENATAL CARE: CPT

## 2025-02-11 PROCEDURE — 76817 TRANSVAGINAL US OBSTETRIC: CPT

## 2025-03-17 ENCOUNTER — APPOINTMENT (OUTPATIENT)
Dept: OBGYN | Facility: CLINIC | Age: 30
End: 2025-03-17
Payer: COMMERCIAL

## 2025-03-17 VITALS
HEIGHT: 64 IN | BODY MASS INDEX: 27.31 KG/M2 | WEIGHT: 160 LBS | SYSTOLIC BLOOD PRESSURE: 115 MMHG | DIASTOLIC BLOOD PRESSURE: 78 MMHG

## 2025-03-17 DIAGNOSIS — Z87.59 PERSONAL HISTORY OF OTHER COMPLICATIONS OF PREGNANCY, CHILDBIRTH AND THE PUERPERIUM: ICD-10-CM

## 2025-03-17 DIAGNOSIS — D35.2 BENIGN NEOPLASM OF PITUITARY GLAND: ICD-10-CM

## 2025-03-17 DIAGNOSIS — Z3A.27 27 WEEKS GESTATION OF PREGNANCY: ICD-10-CM

## 2025-03-17 PROCEDURE — 0502F SUBSEQUENT PRENATAL CARE: CPT

## 2025-03-17 PROCEDURE — 99213 OFFICE O/P EST LOW 20 MIN: CPT

## 2025-03-17 PROCEDURE — 36415 COLL VENOUS BLD VENIPUNCTURE: CPT

## 2025-03-18 LAB
FERRITIN SERPL-MCNC: 6 NG/ML
GLUCOSE 1H P 50 G GLC PO SERPL-MCNC: 143 MG/DL
HCT VFR BLD CALC: 34.2 %
HGB BLD-MCNC: 10.8 G/DL
IRON SATN MFR SERPL: 5 %
IRON SERPL-MCNC: 27 UG/DL
MCHC RBC-ENTMCNC: 26.2 PG
MCHC RBC-ENTMCNC: 31.6 G/DL
MCV RBC AUTO: 82.8 FL
PLATELET # BLD AUTO: 213 K/UL
RBC # BLD: 4.13 M/UL
RBC # FLD: 11.9 %
TIBC SERPL-MCNC: 577 UG/DL
UIBC SERPL-MCNC: 550 UG/DL
WBC # FLD AUTO: 7.46 K/UL

## 2025-03-25 ENCOUNTER — NON-APPOINTMENT (OUTPATIENT)
Age: 30
End: 2025-03-25

## 2025-03-27 LAB
ESTIMATED AVERAGE GLUCOSE: 108 MG/DL
HBA1C MFR BLD HPLC: 5.4 %

## 2025-04-07 ENCOUNTER — TRANSCRIPTION ENCOUNTER (OUTPATIENT)
Age: 30
End: 2025-04-07

## 2025-04-07 ENCOUNTER — APPOINTMENT (OUTPATIENT)
Dept: ANTEPARTUM | Facility: CLINIC | Age: 30
End: 2025-04-07
Payer: SELF-PAY

## 2025-04-07 ENCOUNTER — ASOB RESULT (OUTPATIENT)
Age: 30
End: 2025-04-07

## 2025-04-07 ENCOUNTER — APPOINTMENT (OUTPATIENT)
Dept: OBGYN | Facility: CLINIC | Age: 30
End: 2025-04-07
Payer: MEDICAID

## 2025-04-07 ENCOUNTER — NON-APPOINTMENT (OUTPATIENT)
Age: 30
End: 2025-04-07

## 2025-04-07 VITALS
HEIGHT: 64 IN | DIASTOLIC BLOOD PRESSURE: 74 MMHG | WEIGHT: 160 LBS | SYSTOLIC BLOOD PRESSURE: 111 MMHG | BODY MASS INDEX: 27.31 KG/M2

## 2025-04-07 PROCEDURE — 99213 OFFICE O/P EST LOW 20 MIN: CPT

## 2025-04-07 PROCEDURE — 76819 FETAL BIOPHYS PROFIL W/O NST: CPT | Mod: 59

## 2025-04-07 PROCEDURE — 76816 OB US FOLLOW-UP PER FETUS: CPT

## 2025-04-16 NOTE — ED ADULT NURSE NOTE - NS ED NURSE RECORD ANOTHER VITAL SIGN
[Incontinence] : incontinence [Wake up at night to urinate  How many times?  ___] : wakes up to urinate [unfilled] times during the night [Leakage of urine with urgency] : leakage of urine with urgency [Leakage of urine with straining, coughing, laughing] : leakage of urine with straining, coughing, laughing Yes

## 2025-04-21 ENCOUNTER — NON-APPOINTMENT (OUTPATIENT)
Age: 30
End: 2025-04-21

## 2025-04-21 ENCOUNTER — APPOINTMENT (OUTPATIENT)
Dept: OBGYN | Facility: CLINIC | Age: 30
End: 2025-04-21
Payer: MEDICAID

## 2025-04-21 VITALS
WEIGHT: 166 LBS | HEIGHT: 64 IN | DIASTOLIC BLOOD PRESSURE: 70 MMHG | SYSTOLIC BLOOD PRESSURE: 109 MMHG | BODY MASS INDEX: 28.34 KG/M2

## 2025-04-21 DIAGNOSIS — Z87.59 PERSONAL HISTORY OF OTHER COMPLICATIONS OF PREGNANCY, CHILDBIRTH AND THE PUERPERIUM: ICD-10-CM

## 2025-04-21 DIAGNOSIS — Z23 ENCOUNTER FOR IMMUNIZATION: ICD-10-CM

## 2025-04-21 PROCEDURE — 0502F SUBSEQUENT PRENATAL CARE: CPT

## 2025-04-29 ENCOUNTER — APPOINTMENT (OUTPATIENT)
Dept: SPINE | Facility: CLINIC | Age: 30
End: 2025-04-29
Payer: MEDICAID

## 2025-04-29 VITALS
WEIGHT: 166 LBS | HEIGHT: 64 IN | SYSTOLIC BLOOD PRESSURE: 109 MMHG | HEART RATE: 90 BPM | BODY MASS INDEX: 28.34 KG/M2 | OXYGEN SATURATION: 100 % | RESPIRATION RATE: 17 BRPM | DIASTOLIC BLOOD PRESSURE: 81 MMHG

## 2025-04-29 DIAGNOSIS — D35.2 BENIGN NEOPLASM OF PITUITARY GLAND: ICD-10-CM

## 2025-04-29 PROCEDURE — 99203 OFFICE O/P NEW LOW 30 MIN: CPT

## 2025-05-02 ENCOUNTER — EMERGENCY (EMERGENCY)
Facility: HOSPITAL | Age: 30
LOS: 1 days | End: 2025-05-02
Admitting: EMERGENCY MEDICINE

## 2025-05-02 ENCOUNTER — APPOINTMENT (OUTPATIENT)
Dept: ANTEPARTUM | Facility: CLINIC | Age: 30
End: 2025-05-02

## 2025-05-02 ENCOUNTER — OUTPATIENT (OUTPATIENT)
Dept: INPATIENT UNIT | Facility: HOSPITAL | Age: 30
LOS: 1 days | End: 2025-05-02
Payer: MEDICAID

## 2025-05-02 VITALS
TEMPERATURE: 98 F | RESPIRATION RATE: 16 BRPM | SYSTOLIC BLOOD PRESSURE: 103 MMHG | DIASTOLIC BLOOD PRESSURE: 57 MMHG | HEART RATE: 70 BPM

## 2025-05-02 VITALS
TEMPERATURE: 98 F | RESPIRATION RATE: 18 BRPM | SYSTOLIC BLOOD PRESSURE: 103 MMHG | HEART RATE: 79 BPM | OXYGEN SATURATION: 100 % | DIASTOLIC BLOOD PRESSURE: 71 MMHG

## 2025-05-02 VITALS
RESPIRATION RATE: 16 BRPM | TEMPERATURE: 98 F | HEART RATE: 71 BPM | DIASTOLIC BLOOD PRESSURE: 56 MMHG | SYSTOLIC BLOOD PRESSURE: 100 MMHG

## 2025-05-02 DIAGNOSIS — O26.899 OTHER SPECIFIED PREGNANCY RELATED CONDITIONS, UNSPECIFIED TRIMESTER: ICD-10-CM

## 2025-05-02 DIAGNOSIS — Z98.890 OTHER SPECIFIED POSTPROCEDURAL STATES: Chronic | ICD-10-CM

## 2025-05-02 PROBLEM — Z78.9 OTHER SPECIFIED HEALTH STATUS: Chronic | Status: INACTIVE | Noted: 2020-02-06 | Resolved: 2025-05-02

## 2025-05-02 LAB
ALBUMIN SERPL ELPH-MCNC: 3.1 G/DL — LOW (ref 3.3–5)
ALP SERPL-CCNC: 110 U/L — SIGNIFICANT CHANGE UP (ref 40–120)
ALT FLD-CCNC: 10 U/L — SIGNIFICANT CHANGE UP (ref 4–33)
AMYLASE P1 CFR SERPL: 110 U/L — SIGNIFICANT CHANGE UP (ref 25–125)
ANION GAP SERPL CALC-SCNC: 15 MMOL/L — HIGH (ref 7–14)
APPEARANCE UR: ABNORMAL
AST SERPL-CCNC: 15 U/L — SIGNIFICANT CHANGE UP (ref 4–32)
BACTERIA # UR AUTO: NEGATIVE /HPF — SIGNIFICANT CHANGE UP
BASOPHILS # BLD AUTO: 0.02 K/UL — SIGNIFICANT CHANGE UP (ref 0–0.2)
BASOPHILS NFR BLD AUTO: 0.3 % — SIGNIFICANT CHANGE UP (ref 0–2)
BILIRUB SERPL-MCNC: 0.2 MG/DL — SIGNIFICANT CHANGE UP (ref 0.2–1.2)
BILIRUB UR-MCNC: NEGATIVE — SIGNIFICANT CHANGE UP
BUN SERPL-MCNC: 6 MG/DL — LOW (ref 7–23)
CALCIUM SERPL-MCNC: 9.3 MG/DL — SIGNIFICANT CHANGE UP (ref 8.4–10.5)
CAST: 0 /LPF — SIGNIFICANT CHANGE UP (ref 0–4)
CHLORIDE SERPL-SCNC: 105 MMOL/L — SIGNIFICANT CHANGE UP (ref 98–107)
CO2 SERPL-SCNC: 19 MMOL/L — LOW (ref 22–31)
COLOR SPEC: YELLOW — SIGNIFICANT CHANGE UP
CREAT ?TM UR-MCNC: 45 MG/DL — SIGNIFICANT CHANGE UP
CREAT SERPL-MCNC: 0.43 MG/DL — LOW (ref 0.5–1.3)
DIFF PNL FLD: NEGATIVE — SIGNIFICANT CHANGE UP
EGFR: 136 ML/MIN/1.73M2 — SIGNIFICANT CHANGE UP
EGFR: 136 ML/MIN/1.73M2 — SIGNIFICANT CHANGE UP
EOSINOPHIL # BLD AUTO: 0.03 K/UL — SIGNIFICANT CHANGE UP (ref 0–0.5)
EOSINOPHIL NFR BLD AUTO: 0.4 % — SIGNIFICANT CHANGE UP (ref 0–6)
GLUCOSE SERPL-MCNC: 109 MG/DL — HIGH (ref 70–99)
GLUCOSE UR QL: NEGATIVE MG/DL — SIGNIFICANT CHANGE UP
HCT VFR BLD CALC: 32.2 % — LOW (ref 34.5–45)
HGB BLD-MCNC: 10.3 G/DL — LOW (ref 11.5–15.5)
IANC: 5.52 K/UL — SIGNIFICANT CHANGE UP (ref 1.8–7.4)
IMM GRANULOCYTES NFR BLD AUTO: 1.4 % — HIGH (ref 0–0.9)
KETONES UR-MCNC: NEGATIVE MG/DL — SIGNIFICANT CHANGE UP
LEUKOCYTE ESTERASE UR-ACNC: NEGATIVE — SIGNIFICANT CHANGE UP
LIDOCAIN IGE QN: 40 U/L — SIGNIFICANT CHANGE UP (ref 7–60)
LYMPHOCYTES # BLD AUTO: 1.29 K/UL — SIGNIFICANT CHANGE UP (ref 1–3.3)
LYMPHOCYTES # BLD AUTO: 16.3 % — SIGNIFICANT CHANGE UP (ref 13–44)
MCHC RBC-ENTMCNC: 24.1 PG — LOW (ref 27–34)
MCHC RBC-ENTMCNC: 32 G/DL — SIGNIFICANT CHANGE UP (ref 32–36)
MCV RBC AUTO: 75.2 FL — LOW (ref 80–100)
MONOCYTES # BLD AUTO: 0.94 K/UL — HIGH (ref 0–0.9)
MONOCYTES NFR BLD AUTO: 11.9 % — SIGNIFICANT CHANGE UP (ref 2–14)
NEUTROPHILS # BLD AUTO: 5.52 K/UL — SIGNIFICANT CHANGE UP (ref 1.8–7.4)
NEUTROPHILS NFR BLD AUTO: 69.7 % — SIGNIFICANT CHANGE UP (ref 43–77)
NITRITE UR-MCNC: NEGATIVE — SIGNIFICANT CHANGE UP
NRBC # BLD AUTO: 0 K/UL — SIGNIFICANT CHANGE UP (ref 0–0)
NRBC # FLD: 0 K/UL — SIGNIFICANT CHANGE UP (ref 0–0)
NRBC BLD AUTO-RTO: 0 /100 WBCS — SIGNIFICANT CHANGE UP (ref 0–0)
PH UR: 7.5 — SIGNIFICANT CHANGE UP (ref 5–8)
PLATELET # BLD AUTO: 195 K/UL — SIGNIFICANT CHANGE UP (ref 150–400)
POTASSIUM SERPL-MCNC: 3.6 MMOL/L — SIGNIFICANT CHANGE UP (ref 3.5–5.3)
POTASSIUM SERPL-SCNC: 3.6 MMOL/L — SIGNIFICANT CHANGE UP (ref 3.5–5.3)
PROT ?TM UR-MCNC: 7 MG/DL — SIGNIFICANT CHANGE UP
PROT SERPL-MCNC: 6.4 G/DL — SIGNIFICANT CHANGE UP (ref 6–8.3)
PROT UR-MCNC: NEGATIVE MG/DL — SIGNIFICANT CHANGE UP
PROT/CREAT UR-RTO: 0.2 RATIO — SIGNIFICANT CHANGE UP (ref 0–0.2)
RBC # BLD: 4.28 M/UL — SIGNIFICANT CHANGE UP (ref 3.8–5.2)
RBC # FLD: 13 % — SIGNIFICANT CHANGE UP (ref 10.3–14.5)
RBC CASTS # UR COMP ASSIST: 0 /HPF — SIGNIFICANT CHANGE UP (ref 0–4)
SODIUM SERPL-SCNC: 139 MMOL/L — SIGNIFICANT CHANGE UP (ref 135–145)
SP GR SPEC: 1.01 — SIGNIFICANT CHANGE UP (ref 1–1.03)
SQUAMOUS # UR AUTO: 2 /HPF — SIGNIFICANT CHANGE UP (ref 0–5)
UROBILINOGEN FLD QL: 0.2 MG/DL — SIGNIFICANT CHANGE UP (ref 0.2–1)
WBC # BLD: 7.91 K/UL — SIGNIFICANT CHANGE UP (ref 3.8–10.5)
WBC # FLD AUTO: 7.91 K/UL — SIGNIFICANT CHANGE UP (ref 3.8–10.5)
WBC UR QL: 1 /HPF — SIGNIFICANT CHANGE UP (ref 0–5)

## 2025-05-02 PROCEDURE — 99222 1ST HOSP IP/OBS MODERATE 55: CPT | Mod: 25

## 2025-05-02 PROCEDURE — L9996: CPT

## 2025-05-02 PROCEDURE — 59025 FETAL NON-STRESS TEST: CPT | Mod: 26

## 2025-05-02 RX ORDER — SODIUM CHLORIDE 9 G/1000ML
1000 INJECTION, SOLUTION INTRAVENOUS ONCE
Refills: 0 | Status: COMPLETED | OUTPATIENT
Start: 2025-05-02 | End: 2025-05-02

## 2025-05-02 RX ORDER — METOCLOPRAMIDE HCL 10 MG
10 TABLET ORAL ONCE
Refills: 0 | Status: COMPLETED | OUTPATIENT
Start: 2025-05-02 | End: 2025-05-02

## 2025-05-02 RX ORDER — FERROUS SULFATE 137(45) MG
1 TABLET, EXTENDED RELEASE ORAL
Refills: 0 | DISCHARGE

## 2025-05-02 RX ADMIN — Medication 20 MILLIGRAM(S): at 04:10

## 2025-05-02 RX ADMIN — Medication 10 MILLIGRAM(S): at 04:10

## 2025-05-02 RX ADMIN — SODIUM CHLORIDE 1000 MILLILITER(S): 9 INJECTION, SOLUTION INTRAVENOUS at 04:10

## 2025-05-02 NOTE — OB PROVIDER TRIAGE NOTE - NSHPPHYSICALEXAM_GEN_ALL_CORE
T(C): 36.6 (05-02-25 @ 03:52), Max: 36.6 (05-02-25 @ 03:52)  HR: 82 (05-02-25 @ 05:21) (70 - 83)  BP: 98/56 (05-02-25 @ 05:21) (98/56 - 109/64)  RR: 16 (05-02-25 @ 03:52) (16 - 18)  SpO2: 100% (05-02-25 @ 03:12) (100% - 100%)    Heart: RRR  Lungs: CTA  Abdomen: Gravid, soft, NT    NST  Baseline  (  ) BPM  Variability ( x )  Moderate   (  ) Minimal  (  ) Absent  (  )  Marked  Accelerations ( x ) 15x15   (  ) 10x10  (  ) no  Decelerations ( x ) no  (  ) Variable  (  ) Early  (  ) Late      Description _________  Contractions ( x ) no  (  ) yes     Description  __________  Interpretation (x  ) reactive   (  )  non-reactive     TAS:

## 2025-05-02 NOTE — OB PROVIDER TRIAGE NOTE - NSOBPROVIDERNOTE_OBGYN_ALL_OB_FT
29y  at 33w5d  No evidence of acute process  Patient reports that she does not feel pain at present  Labs sent WNL  Bedside sono done BPP   D/w Dr López  D/c home with instructions   Labor precautions  Pt instructed on fetal kick counts  Patient to follow up with OB as scheduled  Pt also instructed to return to triage if continued decreased fetal movements, strong contractions, vaginal bleeding or LOF/ROM    Discharge 25 @ 0625

## 2025-05-02 NOTE — OB RN TRIAGE NOTE - FALL HARM RISK - UNIVERSAL INTERVENTIONS
Bed in lowest position, wheels locked, appropriate side rails in place/Call bell, personal items and telephone in reach/Instruct patient to call for assistance before getting out of bed or chair/Non-slip footwear when patient is out of bed/Reva to call system/Physically safe environment - no spills, clutter or unnecessary equipment/Purposeful Proactive Rounding/Room/bathroom lighting operational, light cord in reach

## 2025-05-02 NOTE — OB PROVIDER TRIAGE NOTE - ADDITIONAL INSTRUCTIONS
D/c home with instructions   Labor precautions  Pt instructed on fetal kick counts  Patient to follow up with OB as scheduled  Pt also instructed to return to triage if continued decreased fetal movements, strong contractions, vaginal bleeding or LOF/ROM

## 2025-05-02 NOTE — OB PROVIDER TRIAGE NOTE - HISTORY OF PRESENT ILLNESS
29y  at 33w5d presents to triage c/o severe RUQ pain which started in the middle night followed by vomiting x 3.   Pt states the pain has subsided by the time the patient came to triage. Presently, the patient reports mild pain. Denies any nausea, no fever, no chills, no diarrhea. Denies any chest pain or palpitations.  Denies feeling any contractions, cramping  Reports +FM, no vaginal bleeding, no ROM or LOF  Prenatal care: Dr Hart  Patient denies any prenatal complications so far.

## 2025-05-02 NOTE — ED ADULT TRIAGE NOTE - CHIEF COMPLAINT QUOTE
Pt , 33 weeks pregnant, GIANNI 6/15 c/o 1 hour of RUQ pain associated with n/v/d. + fetal movement. Denies vaginal bleeding, complications with pregnant. OBGYN Dr syed.

## 2025-05-02 NOTE — OB PROVIDER TRIAGE NOTE - NSHPLABSRESULTS_GEN_ALL_CORE
LABS:                          10.3   7.91  )-----------( 195      ( 02 May 2025 04:42 )             32.2     05-02    139  |  105  |  6[L]  ----------------------------<  109[H]  3.6   |  19[L]  |  0.43[L]    Ca    9.3      02 May 2025 04:42    TPro  6.4  /  Alb  3.1[L]  /  TBili  0.2  /  DBili  x   /  AST  15  /  ALT  10  /  AlkPhos  110  05-02

## 2025-05-05 ENCOUNTER — LABORATORY RESULT (OUTPATIENT)
Age: 30
End: 2025-05-05

## 2025-05-05 ENCOUNTER — APPOINTMENT (OUTPATIENT)
Dept: OBGYN | Facility: CLINIC | Age: 30
End: 2025-05-05
Payer: MEDICAID

## 2025-05-05 VITALS
DIASTOLIC BLOOD PRESSURE: 70 MMHG | HEIGHT: 64 IN | BODY MASS INDEX: 28.68 KG/M2 | SYSTOLIC BLOOD PRESSURE: 109 MMHG | WEIGHT: 168 LBS

## 2025-05-05 DIAGNOSIS — D35.2 BENIGN NEOPLASM OF PITUITARY GLAND: ICD-10-CM

## 2025-05-05 DIAGNOSIS — O99.891 OTHER SPECIFIED DISEASES AND CONDITIONS COMPLICATING PREGNANCY: ICD-10-CM

## 2025-05-05 DIAGNOSIS — O21.2 LATE VOMITING OF PREGNANCY: ICD-10-CM

## 2025-05-05 DIAGNOSIS — Z3A.34 34 WEEKS GESTATION OF PREGNANCY: ICD-10-CM

## 2025-05-05 DIAGNOSIS — R10.11 RIGHT UPPER QUADRANT PAIN: ICD-10-CM

## 2025-05-05 DIAGNOSIS — Z3A.33 33 WEEKS GESTATION OF PREGNANCY: ICD-10-CM

## 2025-05-05 DIAGNOSIS — O26.893 OTHER SPECIFIED PREGNANCY RELATED CONDITIONS, THIRD TRIMESTER: ICD-10-CM

## 2025-05-05 DIAGNOSIS — D64.9 ANEMIA, UNSPECIFIED: ICD-10-CM

## 2025-05-05 DIAGNOSIS — O99.013 ANEMIA COMPLICATING PREGNANCY, THIRD TRIMESTER: ICD-10-CM

## 2025-05-05 PROCEDURE — 36415 COLL VENOUS BLD VENIPUNCTURE: CPT

## 2025-05-05 PROCEDURE — 0502F SUBSEQUENT PRENATAL CARE: CPT

## 2025-05-06 LAB
ALBUMIN SERPL ELPH-MCNC: 3.4 G/DL
ALP BLD-CCNC: 118 U/L
ALT SERPL-CCNC: 11 U/L
ANION GAP SERPL CALC-SCNC: 14 MMOL/L
AST SERPL-CCNC: 13 U/L
BILIRUB SERPL-MCNC: 0.3 MG/DL
BUN SERPL-MCNC: 6 MG/DL
CALCIUM SERPL-MCNC: 9 MG/DL
CHLORIDE SERPL-SCNC: 103 MMOL/L
CO2 SERPL-SCNC: 17 MMOL/L
CREAT SERPL-MCNC: 0.48 MG/DL
EGFRCR SERPLBLD CKD-EPI 2021: 132 ML/MIN/1.73M2
GLUCOSE SERPL-MCNC: 130 MG/DL
POTASSIUM SERPL-SCNC: 3.8 MMOL/L
PROT SERPL-MCNC: 6.4 G/DL
SODIUM SERPL-SCNC: 135 MMOL/L

## 2025-05-09 LAB — FERRITIN SERPL-MCNC: 8 NG/ML

## 2025-05-12 LAB
BILIRUB DIRECT SERPL-MCNC: 0.1 MG/DL
BILIRUB INDIRECT SERPL-MCNC: 0.2 MG/DL
FOLATE SERPL-MCNC: 15.7 NG/ML
T PALLIDUM AB SER QL IA: NEGATIVE
VIT B12 SERPL-MCNC: 310 PG/ML

## 2025-05-17 LAB
BASOPHILS # BLD AUTO: 0.01 K/UL
BASOPHILS NFR BLD AUTO: 0.1 %
EOSINOPHIL # BLD AUTO: 0.03 K/UL
EOSINOPHIL NFR BLD AUTO: 0.4 %
HCT VFR BLD CALC: 34 %
HGB BLD-MCNC: 10.1 G/DL
HIV1+2 AB SPEC QL IA.RAPID: NONREACTIVE
IMM GRANULOCYTES NFR BLD AUTO: 0.5 %
IRON SATN MFR SERPL: 5 %
IRON SERPL-MCNC: 27 UG/DL
LYMPHOCYTES # BLD AUTO: 1.31 K/UL
LYMPHOCYTES NFR BLD AUTO: 17.4 %
MAN DIFF?: NORMAL
MCHC RBC-ENTMCNC: 24 PG
MCHC RBC-ENTMCNC: 29.7 G/DL
MCV RBC AUTO: 81 FL
MONOCYTES # BLD AUTO: 0.76 K/UL
MONOCYTES NFR BLD AUTO: 10.1 %
NEUTROPHILS # BLD AUTO: 5.38 K/UL
NEUTROPHILS NFR BLD AUTO: 71.5 %
PLATELET # BLD AUTO: 182 K/UL
RBC # BLD: 4.2 M/UL
RBC # FLD: 14.1 %
TIBC SERPL-MCNC: 589 UG/DL
UIBC SERPL-MCNC: 562 UG/DL
WBC # FLD AUTO: 7.53 K/UL

## 2025-05-20 ENCOUNTER — NON-APPOINTMENT (OUTPATIENT)
Age: 30
End: 2025-05-20

## 2025-05-21 ENCOUNTER — APPOINTMENT (OUTPATIENT)
Dept: SPINE | Facility: CLINIC | Age: 30
End: 2025-05-21

## 2025-05-26 NOTE — ED PROVIDER NOTE - PSH
"Subjective   Jennifer Soares is a 40 y.o.  who is here for a postpartum visit.  Baby was delivered by  on 4/15/2025.  Complications included Single Umbilical Artery  She is doing well.   She denies any symptoms of postpartum blues or depression.  She is breastfeeding with good milk supply.  Vaginal bleeding is light.  She has not had intercourse yet.        Objective   /68   Ht 1.778 m (5' 10\")   Wt 70.3 kg (155 lb)   LMP 2024   Breastfeeding Yes   BMI 22.24 kg/m²        General:   Alert and oriented, in no acute distress   Neck:    Breast/Axilla:    Abdomen: Soft, non-tender, without masses or organomegaly   Vulva: Normal architecture without erythema, masses, or lesions.    Vagina: Normal mucosa without lesions, masses, or atrophy. No abnormal vaginal discharge.    Cervix: Normal without masses, lesions, or signs of cervicitis.    Uterus: Normal mobile, non-enlarged uterus    Adnexa: Normal without masses or lesions   Pelvic Floor No POP noted. No high tone pelvic floor    Psych Normal affect. Normal mood.        Assessment/Plan   Doing well postpartum.   Continue prenatal vitamins.  Okay to resume normal activities.  Planning condoms for birth control.  Follow up for annual exam.      " No significant past surgical history

## 2025-05-28 ENCOUNTER — APPOINTMENT (OUTPATIENT)
Dept: OBGYN | Facility: CLINIC | Age: 30
End: 2025-05-28
Payer: MEDICAID

## 2025-05-28 VITALS
WEIGHT: 176 LBS | HEIGHT: 64 IN | SYSTOLIC BLOOD PRESSURE: 107 MMHG | DIASTOLIC BLOOD PRESSURE: 75 MMHG | BODY MASS INDEX: 30.05 KG/M2

## 2025-05-28 DIAGNOSIS — Z3A.37 37 WEEKS GESTATION OF PREGNANCY: ICD-10-CM

## 2025-05-28 PROCEDURE — 36415 COLL VENOUS BLD VENIPUNCTURE: CPT

## 2025-05-28 PROCEDURE — 0502F SUBSEQUENT PRENATAL CARE: CPT

## 2025-05-28 PROCEDURE — 99213 OFFICE O/P EST LOW 20 MIN: CPT

## 2025-05-29 LAB
ALBUMIN SERPL ELPH-MCNC: 3.4 G/DL
ALP BLD-CCNC: 139 U/L
ALT SERPL-CCNC: 10 U/L
ANION GAP SERPL CALC-SCNC: 13 MMOL/L
AST SERPL-CCNC: 13 U/L
BASOPHILS # BLD AUTO: 0.02 K/UL
BASOPHILS NFR BLD AUTO: 0.3 %
BILIRUB SERPL-MCNC: 0.2 MG/DL
BUN SERPL-MCNC: 8 MG/DL
CALCIUM SERPL-MCNC: 9.3 MG/DL
CHLORIDE SERPL-SCNC: 105 MMOL/L
CO2 SERPL-SCNC: 18 MMOL/L
CREAT SERPL-MCNC: 0.56 MG/DL
CREAT SPEC-SCNC: 46 MG/DL
CREAT/PROT UR: 0.1 RATIO
EGFRCR SERPLBLD CKD-EPI 2021: 127 ML/MIN/1.73M2
EOSINOPHIL # BLD AUTO: 0.04 K/UL
EOSINOPHIL NFR BLD AUTO: 0.6 %
GLUCOSE SERPL-MCNC: 125 MG/DL
HCT VFR BLD CALC: 34.3 %
HGB BLD-MCNC: 10.5 G/DL
IMM GRANULOCYTES NFR BLD AUTO: 0.6 %
LDH SERPL-CCNC: 145 U/L
LYMPHOCYTES # BLD AUTO: 1.25 K/UL
LYMPHOCYTES NFR BLD AUTO: 19.6 %
MAN DIFF?: NORMAL
MCHC RBC-ENTMCNC: 22.9 PG
MCHC RBC-ENTMCNC: 30.6 G/DL
MCV RBC AUTO: 74.9 FL
MONOCYTES # BLD AUTO: 0.63 K/UL
MONOCYTES NFR BLD AUTO: 9.9 %
NEUTROPHILS # BLD AUTO: 4.39 K/UL
NEUTROPHILS NFR BLD AUTO: 69 %
PLATELET # BLD AUTO: 213 K/UL
POTASSIUM SERPL-SCNC: 4 MMOL/L
PROT SERPL-MCNC: 6.7 G/DL
PROT UR-MCNC: 6 MG/DL
RBC # BLD: 4.58 M/UL
RBC # FLD: 15.2 %
SODIUM SERPL-SCNC: 136 MMOL/L
URATE SERPL-MCNC: 4 MG/DL
WBC # FLD AUTO: 6.37 K/UL

## 2025-05-30 LAB
GP B STREP DNA SPEC QL NAA+PROBE: NOT DETECTED
SOURCE GBS: NORMAL

## 2025-06-02 PROBLEM — O99.019 ANEMIA IN PREGNANCY: Status: ACTIVE | Noted: 2025-06-02
